# Patient Record
Sex: FEMALE | URBAN - METROPOLITAN AREA
[De-identification: names, ages, dates, MRNs, and addresses within clinical notes are randomized per-mention and may not be internally consistent; named-entity substitution may affect disease eponyms.]

---

## 2024-03-19 ENCOUNTER — TELEPHONE (OUTPATIENT)
Dept: URGENT CARE | Facility: CLINIC | Age: 44
End: 2024-03-19

## 2024-03-19 NOTE — TELEPHONE ENCOUNTER
Pt has an old claim from 2021, (see MRI scanned in).  She will call back with the old case file number as she stated her  will be merging the 2021 claim to the current 3/11/2024 claim ,([id number]).   I've also asked for prior records related to the previous claim, treatments, surgeries and therapies before we would even begin the process of scheduling.